# Patient Record
Sex: FEMALE | Race: BLACK OR AFRICAN AMERICAN | NOT HISPANIC OR LATINO | Employment: OTHER | URBAN - METROPOLITAN AREA
[De-identification: names, ages, dates, MRNs, and addresses within clinical notes are randomized per-mention and may not be internally consistent; named-entity substitution may affect disease eponyms.]

---

## 2018-02-23 ENCOUNTER — HOSPITAL ENCOUNTER (EMERGENCY)
Facility: HOSPITAL | Age: 63
Discharge: HOME | End: 2018-02-23
Payer: COMMERCIAL

## 2018-02-23 VITALS
HEART RATE: 80 BPM | OXYGEN SATURATION: 99 % | SYSTOLIC BLOOD PRESSURE: 150 MMHG | RESPIRATION RATE: 18 BRPM | DIASTOLIC BLOOD PRESSURE: 90 MMHG | TEMPERATURE: 99 F

## 2018-02-23 DIAGNOSIS — S00.03XA CONTUSION OF SCALP, INITIAL ENCOUNTER: ICD-10-CM

## 2018-02-23 DIAGNOSIS — S09.90XA INJURY OF HEAD, INITIAL ENCOUNTER: Primary | ICD-10-CM

## 2018-02-23 DIAGNOSIS — J32.3 SPHENOID SINUSITIS, UNSPECIFIED CHRONICITY: ICD-10-CM

## 2018-02-23 PROCEDURE — 99284 EMERGENCY DEPT VISIT MOD MDM: CPT

## 2018-02-23 RX ORDER — ASPIRIN 81 MG/1
81 TABLET ORAL EVERY OTHER DAY
COMMUNITY

## 2018-02-23 RX ORDER — VERAPAMIL HYDROCHLORIDE 240 MG/1
240 CAPSULE, EXTENDED RELEASE ORAL DAILY
COMMUNITY

## 2018-02-23 RX ORDER — AMITRIPTYLINE HYDROCHLORIDE 25 MG/1
25 TABLET, FILM COATED ORAL NIGHTLY PRN
COMMUNITY

## 2018-02-23 RX ORDER — AMOXICILLIN AND CLAVULANATE POTASSIUM 875; 125 MG/1; MG/1
1 TABLET, FILM COATED ORAL 2 TIMES DAILY
Qty: 14 TABLET | Refills: 0 | Status: SHIPPED | OUTPATIENT
Start: 2018-02-23 | End: 2018-03-02

## 2018-02-23 NOTE — ED PROVIDER NOTES
History      Chief Complaint   Patient presents with    Head Injury     pt was lying in bed when picture fell off and hit her in the head, pt confused per family       Review of patient's allergies indicates:  No Known Allergies     HPI   HPI    2/23/2018, 9:41 AM   History obtained from the patient      History of Present Illness: Loraine Lee is a 63 y.o. female patient who presents to the Emergency Department for head injury that occurred PTA. Patient states she woke up and had picture on her head that had fallen off the wall.  Patient states that she had several teeth pulled yesterday; reports only had local anesthesia; complains of facial pain.  Patient states that she took Ibuprofen 800 mg prior to going to bed last night.  Patient states that she has Norco available if needed.  Patient complains of facial pain, headache.  Patient slept in wig and states that picture landed on wig.  Patient's family report that patient has displayed some confusion this morning.   Denies LOC, syncope, speech difficulty, dizziness.        Arrival mode:   EMS     PCP: Provider [facility]       Past Medical History:  Past Medical History:   Diagnosis Date    Hypertension     Murmur, cardiac        Past Surgical History:  Past Surgical History:   Procedure Laterality Date    ANKLE SURGERY Bilateral     CATARACT EXTRACTION Bilateral     MOUTH SURGERY      TOTAL KNEE ARTHROPLASTY Bilateral          Family History:  History reviewed. No pertinent family history.    Social History:  Social History     Social History Main Topics    Smoking status: Never Smoker    Smokeless tobacco: Never Used    Alcohol use No    Drug use: No    Sexual activity: Not on file       ROS   Review of Systems   Constitutional: Negative for chills and fever.   HENT: Negative for congestion and rhinorrhea.    Eyes: Positive for visual disturbance (blurry vision). Negative for pain, discharge and redness.   Respiratory: Negative for cough and  wheezing.    Cardiovascular: Negative for chest pain and palpitations.   Gastrointestinal: Negative for nausea and vomiting.   Neurological: Positive for headaches.       Physical Exam      Initial Vitals [02/23/18 0749]   BP Pulse Resp Temp SpO2   (!) 150/90 80 18 98.6 °F (37 °C) 99 %      MAP       110          Physical Exam  Nursing Notes and Vital Signs Reviewed.  Constitutional: Patient is in no apparent distress. Awake and alert. Well-developed and well-nourished.  Head: Atraumatic. Normocephalic.  No bruising or swelling noted.   Eyes: PERRL. EOM intact. Conjunctivae are not pale. No scleral icterus.  ENT: Mucous membranes are moist. Oropharynx is clear and symmetric.    Neck: Supple. Full ROM. No lymphadenopathy.  Cardiovascular: Regular rate. Regular rhythm.   Pulmonary/Chest: No respiratory distress. Clear to auscultation bilaterally. No wheezing, rales, or rhonchi.  Abdominal: Soft and non-distended.  There is no tenderness.  No rebound, guarding, or rigidity. Good bowel sounds.  Genitourinary: No CVA tenderness  Musculoskeletal: Moves all extremities. No obvious deformities. No edema. No calf tenderness.  Skin: Warm and dry.  Neurological:  Alert, awake, and appropriate.  Normal speech.  No acute focal neurological deficits are appreciated.  Cranial nerves II-XII intact.  GCS: 15.  Equal strength BUE.  Equal strength BLE.    Psychiatric: Normal affect. Good eye contact. Appropriate in content.    ED Course    Procedures  ED Vital Signs:  Vitals:    02/23/18 0749   BP: (!) 150/90   Pulse: 80   Resp: 18   Temp: 98.6 °F (37 °C)   SpO2: 99%       Abnormal Lab Results:  Labs Reviewed - No data to display         Imaging Results:  Imaging Results          CT Head Without Contrast (Final result)  Result time 02/23/18 08:43:32    Final result by NAHOMY Walters Sr., MD (02/23/18 08:43:32)                 Impression:      1. There is no calvarial fracture or intracranial hemorrhage.  2. There is mild partial  opacification of the sphenoidal sinuses. This is characteristic of sinusitis.  3. There is a mild amount of generalized cerebral cortical atrophy. There is no evidence of an acute ischemic event.         All CT scans at this facility use dose modulation, iterative reconstruction, and/or weight base dosing when appropriate to reduce radiation dose when appropriate to reduce radiation dose to as low as reasonably achievable.      Electronically signed by: NAHOMY BARRIENTOS MD  Date:     02/23/18  Time:    08:43              Narrative:    CT of Head without IV contrast    History:     Headache status post trauma    Technique: Standard brain CT protocol without IV contrast was performed.     Finding: There is a mild amount of generalized cerebral cortical atrophy. There is no evidence of an acute ischemic event. There is no intracranial hemorrhage. There is no calvarial fracture. There is mild partial opacification of the sphenoidal sinuses.                                      The Emergency Provider reviewed the vital signs and test results, which are outlined above.    ED Discussion     10:10 AM:  Patient refused visual acuity exam.     10:19 AM:  Discussed with pt all pertinent ED information and results. Discussed pt dx and plan of tx. Gave pt all f/u and return to the ED instructions. All questions and concerns were addressed at this time. Pt expresses understanding of information and instructions, and is comfortable with plan to discharge. Pt is stable for discharge.    Pre-hypertension/Hypertension: The pt has been informed that they may have pre-hypertension or hypertension based on a blood pressure reading in the ED. I recommend that the pt call the PCP listed on their discharge instructions or a physician of their choice this week to arrange f/u for further evaluation of possible pre-hypertension or hypertension.     I discussed with patient and/or family/caretaker that evaluation in the ED does not suggest any  emergent or life threatening medical conditions requiring immediate intervention beyond what was provided in the ED, and I believe patient is safe for discharge.  Regardless, an unremarkable evaluation in the ED does not preclude the development or presence of a serious of life threatening condition. As such, patient was instructed to return immediately for any worsening or change in current symptoms.      ED Medication(s):  Medications - No data to display    Discharge Medication List as of 2/23/2018 10:19 AM      START taking these medications    Details   amoxicillin-clavulanate 875-125mg (AUGMENTIN) 875-125 mg per tablet Take 1 tablet by mouth 2 (two) times daily., Starting Fri 2/23/2018, Until Fri 3/2/2018, Print             Follow-up Information     Provider [facility] In 3 days.           [facility] In 3 days.    Specialty:  Optometry  [facility] information:  [address] [facility] [address] [phone]  Additional information:  (off [facility]) 1st Floor                   Medical Decision Making                  Clinical Impression       ICD-10-CM ICD-9-CM   1. Injury of head, initial encounter S09.90XA 959.01   2. Sphenoid sinusitis, unspecified chronicity J32.3 473.3   3. Contusion of scalp, initial encounter S00.03XA 920       Disposition:   Disposition: Discharged  Condition: Stable           Arielle Angel PA-C  02/23/18 1640       Arielle Angel PA-C  02/23/18 1640